# Patient Record
Sex: MALE | Race: OTHER | NOT HISPANIC OR LATINO | URBAN - METROPOLITAN AREA
[De-identification: names, ages, dates, MRNs, and addresses within clinical notes are randomized per-mention and may not be internally consistent; named-entity substitution may affect disease eponyms.]

---

## 2019-03-25 ENCOUNTER — IMPORTED ENCOUNTER (OUTPATIENT)
Dept: URBAN - METROPOLITAN AREA CLINIC 1 | Facility: CLINIC | Age: 59
End: 2019-03-25

## 2019-03-25 PROBLEM — E11.3293: Noted: 2019-03-25

## 2019-03-25 PROBLEM — Z79.4: Noted: 2019-03-25

## 2019-03-25 PROBLEM — H25.813: Noted: 2019-03-25

## 2019-03-25 PROCEDURE — 92004 COMPRE OPH EXAM NEW PT 1/>: CPT

## 2019-03-25 PROCEDURE — 92015 DETERMINE REFRACTIVE STATE: CPT

## 2019-03-25 NOTE — PATIENT DISCUSSION
1.  DM Type II (IDDM) with Mild Nonproliferative Diabetic Retinopathy OU No Macular Edema. Discussed the pathophysiology of diabetes and its effect on the eye and risk of blindness. Stressed the importance of strong glucose control. Advised of importance of at least yearly dilated examinations but to contact us immediately for any problems or concerns. 2. Cataract OU -- Observe for now without intervention. The patient was advised to contact us if any change or worsening of visionFinalized Glasses Mrx today. Letter to [address] for an appointment in 1 year for a 30 DME with Dr. Jimmy Benjamin.

## 2020-05-11 ENCOUNTER — IMPORTED ENCOUNTER (OUTPATIENT)
Dept: URBAN - METROPOLITAN AREA CLINIC 1 | Facility: CLINIC | Age: 60
End: 2020-05-11

## 2020-05-11 PROBLEM — Z79.4: Noted: 2020-05-11

## 2020-05-11 PROBLEM — H25.813: Noted: 2020-05-11

## 2020-05-11 PROBLEM — E11.3293: Noted: 2020-05-11

## 2020-05-11 PROBLEM — H11.153: Noted: 2020-05-11

## 2020-05-11 PROCEDURE — 92014 COMPRE OPH EXAM EST PT 1/>: CPT

## 2020-05-11 PROCEDURE — 92015 DETERMINE REFRACTIVE STATE: CPT

## 2020-05-11 NOTE — PATIENT DISCUSSION
1.  DM Type II (Insulin) with Mild Nonproliferative Diabetic Retinopathy OU No Macular Edema:  Discussed the pathophysiology of diabetes and its effect on the eye and risk of blindness. Stressed the importance of strong glucose control. Advised of importance of at least yearly dilated examinations but to contact us immediately for any problems or concerns. 2. Cataract OU: Observe for now without intervention. The patient was advised to contact us if any change or worsening of vision3. Pinguecula OU - Use of sunglasses when exposed to UV light and observation is recommended. MRX for glasses given. Return for an appointment in 6 months 10/DFE with Dr. Guzman Gerard.

## 2020-11-11 ENCOUNTER — IMPORTED ENCOUNTER (OUTPATIENT)
Dept: URBAN - METROPOLITAN AREA CLINIC 1 | Facility: CLINIC | Age: 60
End: 2020-11-11

## 2020-11-11 PROBLEM — E11.3293: Noted: 2020-11-11

## 2020-11-11 PROBLEM — Z79.4: Noted: 2020-11-11

## 2020-11-11 PROCEDURE — 92012 INTRM OPH EXAM EST PATIENT: CPT

## 2020-11-11 NOTE — PATIENT DISCUSSION
1.  DM Type II (Insulin) with Mild Nonproliferative Diabetic Retinopathy OU No Macular Edema:  Discussed the pathophysiology of diabetes and its effect on the eye and risk of blindness. Stressed the importance of strong glucose control. Advised of importance of at least yearly dilated examinations but to contact us immediately for any problems or concerns. 2. Cataract OU: Observe for now without intervention. The patient was advised to contact us if any change or worsening of vision3. Pinguecula OU - Use of sunglasses when exposed to UV light and observation is recommended. Return for an appointment in 6 mo 30 with Dr. Thomas Gee.

## 2021-05-17 ENCOUNTER — IMPORTED ENCOUNTER (OUTPATIENT)
Dept: URBAN - METROPOLITAN AREA CLINIC 1 | Facility: CLINIC | Age: 61
End: 2021-05-17

## 2021-05-17 PROBLEM — E11.3293: Noted: 2021-05-17

## 2021-05-17 PROBLEM — H11.153: Noted: 2021-05-17

## 2021-05-17 PROBLEM — Z79.4: Noted: 2021-05-17

## 2021-05-17 PROBLEM — Z79.84: Noted: 2021-05-17

## 2021-05-17 PROBLEM — H25.813: Noted: 2021-05-17

## 2021-05-17 PROCEDURE — 92015 DETERMINE REFRACTIVE STATE: CPT

## 2021-05-17 PROCEDURE — 92014 COMPRE OPH EXAM EST PT 1/>: CPT

## 2021-05-17 NOTE — PATIENT DISCUSSION
1.  DM Type II (Insulin/Oral Medications) with Mild Nonproliferative Diabetic Retinopathy OU No Macular Edema- Discussed the pathophysiology of diabetes and its effect on the eye and risk of blindness. Stressed the importance of strong glucose control. Advised of importance of at least yearly dilated examinations but to contact us immediately for any problems or concerns. 2. Cataract OU- Observe for now without intervention. The patient was advised to contact us if any change or worsening of vision3. Pinguecula OU- Use of sunglasses when exposed to UV light and observation is recommended. MRX for glasses givenLetter to [address] for an appointment in 1 year 27 with Dr. George Hollingsworth.

## 2022-04-02 ASSESSMENT — TONOMETRY
OD_IOP_MMHG: 16
OS_IOP_MMHG: 17
OS_IOP_MMHG: 17
OS_IOP_MMHG: 16
OD_IOP_MMHG: 16
OD_IOP_MMHG: 17
OS_IOP_MMHG: 16
OD_IOP_MMHG: 16

## 2022-04-02 ASSESSMENT — VISUAL ACUITY
OS_CC: 20/25
OD_CC: 20/25-2
OS_CC: 20/20
OS_CC: 20/20
OD_CC: 20/30
OS_CC: 20/25
OD_GLARE: 20/40
OS_GLARE: 20/40
OD_CC: 20/20
OD_CC: 20/25

## 2022-05-23 ENCOUNTER — ESTABLISHED PATIENT (OUTPATIENT)
Dept: URBAN - METROPOLITAN AREA CLINIC 1 | Facility: CLINIC | Age: 62
End: 2022-05-23

## 2022-05-23 DIAGNOSIS — H25.813: ICD-10-CM

## 2022-05-23 DIAGNOSIS — E11.3293: ICD-10-CM

## 2022-05-23 PROCEDURE — 92015 DETERMINE REFRACTIVE STATE: CPT

## 2022-05-23 PROCEDURE — 92014 COMPRE OPH EXAM EST PT 1/>: CPT

## 2022-05-23 ASSESSMENT — TONOMETRY
OD_IOP_MMHG: 17
OS_IOP_MMHG: 17

## 2022-05-23 ASSESSMENT — VISUAL ACUITY
OS_SC: J1
OS_SC: 20/20-1
OD_SC: 20/20-2
OD_SC: J1

## 2023-09-18 ENCOUNTER — COMPREHENSIVE EXAM (OUTPATIENT)
Dept: URBAN - METROPOLITAN AREA CLINIC 1 | Facility: CLINIC | Age: 63
End: 2023-09-18

## 2023-09-18 DIAGNOSIS — E11.3293: ICD-10-CM

## 2023-09-18 DIAGNOSIS — H11.153: ICD-10-CM

## 2023-09-18 DIAGNOSIS — H04.123: ICD-10-CM

## 2023-09-18 DIAGNOSIS — H25.813: ICD-10-CM

## 2023-09-18 PROCEDURE — 92014 COMPRE OPH EXAM EST PT 1/>: CPT

## 2023-09-18 PROCEDURE — 92015 DETERMINE REFRACTIVE STATE: CPT

## 2023-09-18 ASSESSMENT — TONOMETRY
OS_IOP_MMHG: 18
OD_IOP_MMHG: 17

## 2023-09-18 ASSESSMENT — VISUAL ACUITY
OD_CC: J1
OS_SC: 20/20
OD_SC: 20/20-2
OS_CC: J1

## 2024-12-16 ENCOUNTER — COMPREHENSIVE EXAM (OUTPATIENT)
Age: 64
End: 2024-12-16

## 2024-12-16 DIAGNOSIS — H11.153: ICD-10-CM

## 2024-12-16 DIAGNOSIS — H04.123: ICD-10-CM

## 2024-12-16 DIAGNOSIS — E11.3293: ICD-10-CM

## 2024-12-16 DIAGNOSIS — H25.813: ICD-10-CM

## 2024-12-16 PROCEDURE — 92015 DETERMINE REFRACTIVE STATE: CPT

## 2024-12-16 PROCEDURE — 92250 FUNDUS PHOTOGRAPHY W/I&R: CPT

## 2024-12-16 PROCEDURE — 99214 OFFICE O/P EST MOD 30 MIN: CPT
